# Patient Record
Sex: FEMALE | Race: BLACK OR AFRICAN AMERICAN | NOT HISPANIC OR LATINO | Employment: FULL TIME | URBAN - METROPOLITAN AREA
[De-identification: names, ages, dates, MRNs, and addresses within clinical notes are randomized per-mention and may not be internally consistent; named-entity substitution may affect disease eponyms.]

---

## 2020-09-29 ENCOUNTER — TELEPHONE (OUTPATIENT)
Dept: FAMILY MEDICINE CLINIC | Facility: CLINIC | Age: 50
End: 2020-09-29

## 2020-09-29 NOTE — TELEPHONE ENCOUNTER
PATIENT IS CALLING NEEDING DR. RICCI TO  REFER TO A OBGYN DOCTOR FOR HER YEARLY EXAM      PATIENT WOULD LIKE TO HAVE A CALL BACK WITH HER RESULTS FROM DR. MOELLER OFFICE ON HER HEART MONITOR AND EKG THAT WAS DONE.        PATIENT CONTACT @[phone]

## 2020-09-29 NOTE — TELEPHONE ENCOUNTER
Report not posted yet on [employer] website. They have just received the monitor today. Will check back tomorrow to see if report is available.

## 2020-09-29 NOTE — TELEPHONE ENCOUNTER
PATIENT WOULD LIKE TO HAVE A CALL BACK WITH HER RESULTS FROM DR. MOELLER OFFICE ON HER HEART MONITOR AND EKG THAT WAS DONE.           PATIENT CONTACT @[phone]

## 2020-10-02 NOTE — TELEPHONE ENCOUNTER
Spoke with patient and informed her I can review test results with dr miller on Monday and will call her Tuesday.

## 2021-10-13 ENCOUNTER — TELEPHONE (OUTPATIENT)
Dept: FAMILY MEDICINE CLINIC | Facility: CLINIC | Age: 51
End: 2021-10-13

## 2021-10-13 NOTE — TELEPHONE ENCOUNTER
Caller: CorreaDave    Relationship: Self    Best call back number: [phone] (M)    Caller requesting test results: PATIENT    What test was performed: LABS    When was the test performed: 10/5/21    Where was the test performed: IN OFFICE    Additional notes: PLEASE CALL PATIENT WITH RESULTS. THANK YOU.

## 2021-10-14 NOTE — TELEPHONE ENCOUNTER
A1C normal, blood sugar just borderline  Thyroid, kidney and liver function normal  Cholesterol mildly high, though improved from last check.  Vitamin D normal range  B12 normal range.

## 2021-10-14 NOTE — TELEPHONE ENCOUNTER
Caller: Dave Correa    Relationship: Self    Best call back number: [phone]    What was the call regarding: PLEASE CALL PATIENT WITH LAB RESULTS.    Do you require a callback: YES

## 2022-11-04 ENCOUNTER — TELEPHONE (OUTPATIENT)
Dept: FAMILY MEDICINE CLINIC | Facility: CLINIC | Age: 52
End: 2022-11-04

## 2022-11-04 NOTE — TELEPHONE ENCOUNTER
Caller: Dave Correa    Relationship: Self    Best call back number: [phone]    What orders are you requesting (i.e. lab or imaging): MAMMOGRAM     In what timeframe would the patient need to come in: ASAP     Where will you receive your lab/imaging services: PAT[facility] [address]     Additional notes: PATIENT WOULD LIKE FOR THIS ORDER TO BE PUT IN AND FOR SOMEONE TO GIVE HER A CALL TO GET HER SCHEDULED.    PLEASE ADVISE

## 2023-06-20 ENCOUNTER — TELEPHONE (OUTPATIENT)
Dept: FAMILY MEDICINE CLINIC | Facility: CLINIC | Age: 53
End: 2023-06-20

## 2023-06-20 NOTE — TELEPHONE ENCOUNTER
Caller: Dave Correa    Relationship to patient: Self    Best call back number: [phone]    Patient is needing:     SAW DOCTOR RICCI AT THE END OF MARCH AND WAS TOLD IF THE PROBLEMS PERSISTED THAT HE WOULD SEND HER TO A SPECIALIST.    SHE STATES THE ISSUES ARE ONGOING AND WOULD LIKE TO GO AHEAD AND GET THAT REFERRAL.    WOULD LIKE A CALLBACK TO DISCUSS.

## 2023-06-21 NOTE — TELEPHONE ENCOUNTER
Pt states she is still having issues with her butt and states she had mentioned this at last appt please advise

## 2023-08-02 ENCOUNTER — TELEPHONE (OUTPATIENT)
Dept: FAMILY MEDICINE CLINIC | Facility: CLINIC | Age: 53
End: 2023-08-02

## 2023-08-02 NOTE — TELEPHONE ENCOUNTER
PATIENT CALLED STATING THAT SHE IS HAVING PAIN IN HER RIGHT BREAST AND IS UNSURE HOW TO PROCEED.    THE PAIN OCCURS AT THE BOTTOM OF THE BREAST, MOST NOTICEABLE WHEN BENDING OVER OR LAYING DOWN.    PLEASE ADVISE  [phone]

## 2023-08-14 ENCOUNTER — OFFICE VISIT (OUTPATIENT)
Dept: SURGERY | Facility: CLINIC | Age: 53
End: 2023-08-14
Payer: COMMERCIAL

## 2023-08-14 VITALS
BODY MASS INDEX: 33.46 KG/M2 | WEIGHT: 196 LBS | HEIGHT: 64 IN | SYSTOLIC BLOOD PRESSURE: 168 MMHG | DIASTOLIC BLOOD PRESSURE: 90 MMHG

## 2023-08-14 DIAGNOSIS — N64.4 MASTODYNIA: Primary | ICD-10-CM

## 2023-08-14 PROCEDURE — 99204 OFFICE O/P NEW MOD 45 MIN: CPT | Performed by: STUDENT IN AN ORGANIZED HEALTH CARE EDUCATION/TRAINING PROGRAM

## 2023-08-14 NOTE — PROGRESS NOTES
GENERAL SURGERY HISTORY AND PHYSICAL     SUMMARY:  52 y.o. F with a new diagnosis of right greater than left mastodynia.  -Surgical plan: diagnostic mammogram and ultrasound.  -Nichelleneymar Terrence lifetime breast cancer risk: 9.2%. This patient does not qualify for high risk screening.     Referring Provider: Ramy Galarza DO    Chief complaint: Bilateral breast pain, right greater than left    HPI: Ms. Dave Crowell is a 53 yo woman, seen at the request of Ramy Galarza DO, for a new diagnosis of mastodynia.  She states that for the past 2 weeks she has noted pain in her bilateral breasts, mild in the left for about 4 days, and moderate in the right side for about 2 weeks.  She denies any trauma to the area.  She states that she has been managing her pain with ibuprofen.  She has noticed the pain is worse with movement like stretching and transitioning from lying to sitting.  She states that her pain is located at her inframammary folds mostly along her chest wall.  She is uncertain about feeling any new lumps in her breasts or in her axillae.  She does have a lump in her right axilla which she states is stable and previously has been evaluated with diagnostic imaging and found to be a cyst.  She denies any changes to her nipples including nipple inversion, retraction, or drainage.  She denies any changes to the skin overlying her breasts or axillae.  She denies fevers or chills.    She has had regular annual mammograms, with notable results outlined below.    She has a family history of breast cancer in her aunt, and chart review lists a great aunt, maternal cousin, maternal aunt, and paternal aunt as having had breast cancer. She denies any family history of ovarian cancer.     TIMELINE OF WORKUP:    On 10/27/2021 she underwent limited left breast ultrasound and diagnostic bilateral mammogram to evaluate palpable areas of concern in the left breast.  Mammogram demonstrated scattered areas of  fibroglandular tissue. In the right breast, no suspicious masses, calcifications, or areas of distortion were seen.  In the left breast, no discrete abnormality was seen in the region of any of the skin markers marking the patient's palpable areas of concern. Imaging of the palpable areas in the 2:00 left breast 2 cm from the nipple, 2:00 5 cm from the nipple, and 12:00 5 cm from the nipple demonstrated normal breast tissue.  No suspicious masses, calcifications, or areas of distortion were seen. Targeted ultrasound demonstrated no suspicious mass; an incidentally noted 5 mm cyst was seen in the 12:00 left breast 4 cm from the nipple. The overall assessment was BI-RADS Category 2, benign finding, recommending routine follow-up.  The patient was recommended to continue routine annual screening mammogram.    On 2022, the patient underwent bilateral diagnostic mammogram and bilateral breast ultrasound for focal pain in the left breast and a palpable lump in the right axilla which had increased over time.  The breast parenchyma was composed of scattered areas of fibroglandular density.  No suspicious abnormality was identified in either breast.  Targeted ultrasound of the right axilla demonstrated a 21 mm hypoechoic lesion with a tract extending to the skin surface characteristic of a benign dermal lesion, likely a sebaceous cyst or furuncle, for which no follow-up was recommended.  Targeted ultrasound of the left breast was performed at the site of pain at the 2 o'clock position in the subareolar location of the left breast which demonstrated normal glandular tissue.  Continued annual screening mammography was recommended for the patient.    MEDICAL HISTORY:     Gynecologic History:   . P:2  Age at first childbirth: 27  Age at menarche: 11  Age at menopause: Underwent hysterectomy in  for fibroids  Total years of oral contraceptive use: 0  Total years of hormone replacement therapy: 0    Past Medical  History:   Past Medical History:   Diagnosis Date    Arthritis     Hypertension       Vitamin D deficiency    Past Surgical History:    Past Surgical History:   Procedure Laterality Date    COLONOSCOPY N/A 06/25/2019    Procedure: COLONOSCOPY INTO CECUM AND TERMINAL ILEUM WITH COLD BIOPSY POLYPECTOMY;  Surgeon: Hernan Hernandez MD;  Location: [facility] ENDOSCOPY;  Service: Gastroenterology    HYSTERECTOMY Bilateral     TUBAL ABDOMINAL LIGATION Bilateral        Family History:    Family History   Problem Relation Age of Onset    Hypertension Mother     Arthritis Mother     Diabetes Father     Hypertension Father     Arthritis Father     Prostate cancer Father     Rectal cancer Father     Stroke Maternal Aunt     Kidney disease Maternal Aunt     Cancer Maternal Grandmother         lung, smoker    Diabetes Maternal Grandmother     Cancer Maternal Grandfather         uncertain type    Diabetes Maternal Grandfather     Diabetes Paternal Grandmother     Cancer Paternal Grandfather         uncertain    Diabetes Paternal Grandfather        Social History:   Social History     Socioeconomic History    Marital status:    Tobacco Use    Smoking status: Never    Smokeless tobacco: Never   Vaping Use    Vaping Use: Never used   Substance and Sexual Activity    Alcohol use: No    Drug use: No    Sexual activity: Defer     Denies tobacco use  Occasional alcohol use approximately 2 times per month  Denies recreational drug use    ALLERGIES:   No Known Allergies    MEDICATIONS:     Current Outpatient Medications:     Cholecalciferol (Vitamin D3) 1.25 MG (22913 UT) capsule, Take 1 capsule by mouth Every 7 (Seven) Days., Disp: 12 capsule, Rfl: 3    ibuprofen (ADVIL,MOTRIN) 800 MG tablet, Take 1 tablet by mouth Every 8 (Eight) Hours As Needed for Fever (pain). (Patient not taking: Reported on 8/14/2023), Disp: 90 tablet, Rfl: 1    ROS:   Constitutional: Negative for fevers or chills  HENT: Negative for hearing loss  Eyes: Negative for  vision changes  Respiratory: Negative for cough or shortness of breath  Cardiovascular: Negative for chest pain or heart palpitations  Gastrointestinal: Negative for abdominal pain, nausea, vomiting  Musculoskeletal: Negative for joint swelling or gait instability    PHYSICAL EXAM:   Constitutional: Well-developed well-nourished, no acute distress  Eyes: Conjunctiva normal, sclera nonicteric  ENMT: Hearing grossly normal, oral mucosa moist  Neck: Supple, trachea midline  Respiratory: normal inspiratory effort  Cardiovascular: Regular rate, no peripheral edema  Breast: symmetric  Right: No visible abnormalities on inspection while seated, with arms raised or hands on hips. No masses, skin changes, or nipple abnormalities.  Left: No visible abnormalities on inspection while seated, with arms raised or hands on hips. No masses, skin changes, or nipple abnormalities.  Gastrointestinal: Soft, nontender  Lymphatics (palpable nodes): No cervical, supraclavicular or axillary lymphadenopathy. There is a soft palpable subcutaenous mass of the right axilla which patient states has been evaluated on imaging and has remained stable. There is no skin discoloration, tenderness, or drainage of this area.  Skin:  Warm, dry, no rash on visualized skin surfaces  Musculoskeletal: Symmetric strength, normal gait  Psychiatric: Alert and oriented x3, normal affect     Assessment/Plan:  52-year-old female presenting with noncyclical asymmetric right greater than left mastodynia. Physical exam demonstrates no palpable or visual abnormalities with the exception of the subcutaneous mass in her right axilla which the patient states has been stable and previously evaluated with diagnostic imaging. I will recommend diagnostic mammogram and ultrasound and have her follow-up with me in clinic for discussion of the results.    Ramya Gonzalez M.D.  General, Robotic, and Endoscopic Surgery  [facility] [address]  [place], [place], [address]  P: [phone]  F: [phone]

## 2023-08-21 ENCOUNTER — TELEPHONE (OUTPATIENT)
Dept: SURGERY | Facility: CLINIC | Age: 53
End: 2023-08-21

## 2023-08-21 NOTE — TELEPHONE ENCOUNTER
PT HAS CALLED STATING THAT THE NEXT AVAIL DX MAMMO & U/S BREAST IS 9/27. SHE'S WANTING TO KNOW IF SHE CAN GO OUTSIDE OF [facility] TO HAVE THIS DONE.

## 2023-09-27 ENCOUNTER — HOSPITAL ENCOUNTER (OUTPATIENT)
Dept: MAMMOGRAPHY | Facility: HOSPITAL | Age: 53
Discharge: HOME | End: 2023-09-27
Payer: COMMERCIAL

## 2023-09-27 ENCOUNTER — HOSPITAL ENCOUNTER (OUTPATIENT)
Dept: ULTRASOUND IMAGING | Facility: HOSPITAL | Age: 53
Discharge: HOME | End: 2023-09-27
Payer: COMMERCIAL

## 2023-09-27 DIAGNOSIS — N64.4 MASTODYNIA: ICD-10-CM

## 2023-09-27 PROCEDURE — 77066 DX MAMMO INCL CAD BI: CPT

## 2023-09-27 PROCEDURE — G0279 TOMOSYNTHESIS, MAMMO: HCPCS

## 2023-09-27 PROCEDURE — 76642 ULTRASOUND BREAST LIMITED: CPT

## 2023-09-28 ENCOUNTER — DOCUMENTATION (OUTPATIENT)
Dept: FAMILY MEDICINE CLINIC | Facility: CLINIC | Age: 53
End: 2023-09-28
Payer: COMMERCIAL

## 2023-10-02 ENCOUNTER — TELEPHONE (OUTPATIENT)
Dept: SURGERY | Facility: CLINIC | Age: 53
End: 2023-10-02
Payer: COMMERCIAL

## 2023-10-02 NOTE — TELEPHONE ENCOUNTER
Left voicemail with patient regarding results of recent mammogram and ultrasound for evaluation of bilateral breast pain. Imaging studies demonstrated BIRADS-1 findings, negative for evidence of malignancy or significant change in either breast. Routine follow up mammography recommended.      Ramya Gonzalez M.D.  General, Robotic, and Endoscopic Surgery  [facility]    [address], [address]  [place], [place], [address]  P: [phone]  F: [phone]

## 2023-11-27 ENCOUNTER — OFFICE VISIT (OUTPATIENT)
Dept: FAMILY MEDICINE CLINIC | Facility: CLINIC | Age: 53
End: 2023-11-27
Payer: COMMERCIAL

## 2023-11-27 VITALS
BODY MASS INDEX: 35.17 KG/M2 | SYSTOLIC BLOOD PRESSURE: 122 MMHG | HEIGHT: 64 IN | OXYGEN SATURATION: 98 % | DIASTOLIC BLOOD PRESSURE: 82 MMHG | HEART RATE: 65 BPM | WEIGHT: 206 LBS

## 2023-11-27 DIAGNOSIS — Z00.00 WELLNESS EXAMINATION: Primary | ICD-10-CM

## 2023-11-27 DIAGNOSIS — L80 VITILIGO: ICD-10-CM

## 2023-11-27 DIAGNOSIS — E78.5 DYSLIPIDEMIA: ICD-10-CM

## 2023-11-27 DIAGNOSIS — R73.01 IFG (IMPAIRED FASTING GLUCOSE): ICD-10-CM

## 2023-11-27 DIAGNOSIS — E66.9 OBESITY (BMI 30-39.9): ICD-10-CM

## 2023-11-27 DIAGNOSIS — Z12.11 ENCOUNTER FOR COLORECTAL CANCER SCREENING: ICD-10-CM

## 2023-11-27 DIAGNOSIS — Z12.12 ENCOUNTER FOR COLORECTAL CANCER SCREENING: ICD-10-CM

## 2023-11-27 DIAGNOSIS — L81.9 PIGMENTED SKIN LESION: ICD-10-CM

## 2023-11-27 DIAGNOSIS — K63.5 HYPERPLASTIC COLONIC POLYP, UNSPECIFIED PART OF COLON: ICD-10-CM

## 2023-11-27 RX ORDER — TOPIRAMATE 25 MG/1
25 TABLET ORAL 2 TIMES DAILY
Qty: 60 TABLET | Refills: 5 | Status: SHIPPED | OUTPATIENT
Start: 2023-11-27 | End: 2023-12-01

## 2023-11-27 RX ORDER — PHENTERMINE HYDROCHLORIDE 37.5 MG/1
TABLET ORAL
Qty: 15 TABLET | Refills: 2 | Status: SHIPPED | OUTPATIENT
Start: 2023-11-27 | End: 2023-12-01

## 2023-11-27 NOTE — PATIENT INSTRUCTIONS
Calorie Counting for Weight Loss  Calories are units of energy. Your body needs a certain number of calories from food to keep going throughout the day. When you eat or drink more calories than your body needs, your body stores the extra calories mostly as fat. When you eat or drink fewer calories than your body needs, your body burns fat to get the energy it needs.  Calorie counting means keeping track of how many calories you eat and drink each day. Calorie counting can be helpful if you need to lose weight. If you eat fewer calories than your body needs, you should lose weight. Ask your health care provider what a healthy weight is for you.  For calorie counting to work, you will need to eat the right number of calories each day to lose a healthy amount of weight per week. A dietitian can help you figure out how many calories you need in a day and will suggest ways to reach your calorie goal.  A healthy amount of weight to lose each week is usually 1-2 lb (0.5-0.9 kg). This usually means that your daily calorie intake should be reduced by 500-750 calories.  Eating 1,200-1,500 calories a day can help most women lose weight.  Eating 1,500-1,800 calories a day can help most men lose weight.  What do I need to know about calorie counting?  Work with your health care provider or dietitian to determine how many calories you should get each day. To meet your daily calorie goal, you will need to:  Find out how many calories are in each food that you would like to eat. Try to do this before you eat.  Decide how much of the food you plan to eat.  Keep a food log. Do this by writing down what you ate and how many calories it had.  To successfully lose weight, it is important to balance calorie counting with a healthy lifestyle that includes regular activity.  Where do I find calorie information?  The number of calories in a food can be found on a Nutrition Facts label. If a food does not have a Nutrition Facts label, try to  look up the calories online or ask your dietitian for help.  Remember that calories are listed per serving. If you choose to have more than one serving of a food, you will have to multiply the calories per serving by the number of servings you plan to eat. For example, the label on a package of bread might say that a serving size is 1 slice and that there are 90 calories in a serving. If you eat 1 slice, you will have eaten 90 calories. If you eat 2 slices, you will have eaten 180 calories.  How do I keep a food log?  After each time that you eat, record the following in your food log as soon as possible:  What you ate. Be sure to include toppings, sauces, and other extras on the food.  How much you ate. This can be measured in cups, ounces, or number of items.  How many calories were in each food and drink.  The total number of calories in the food you ate.  Keep your food log near you, such as in a pocket-sized notebook or on an sharee or website on your mobile phone. Some programs will calculate calories for you and show you how many calories you have left to meet your daily goal.  What are some portion-control tips?  Know how many calories are in a serving. This will help you know how many servings you can have of a certain food.  Use a measuring cup to measure serving sizes. You could also try weighing out portions on a kitchen scale. With time, you will be able to estimate serving sizes for some foods.  Take time to put servings of different foods on your favorite plates or in your favorite bowls and cups so you know what a serving looks like.  Try not to eat straight from a food's packaging, such as from a bag or box. Eating straight from the package makes it hard to see how much you are eating and can lead to overeating. Put the amount you would like to eat in a cup or on a plate to make sure you are eating the right portion.  Use smaller plates, glasses, and bowls for smaller portions and to prevent  overeating.  Try not to multitask. For example, avoid watching TV or using your computer while eating. If it is time to eat, sit down at a table and enjoy your food. This will help you recognize when you are full. It will also help you be more mindful of what and how much you are eating.  What are tips for following this plan?  Reading food labels  Check the calorie count compared with the serving size. The serving size may be smaller than what you are used to eating.  Check the source of the calories. Try to choose foods that are high in protein, fiber, and vitamins, and low in saturated fat, trans fat, and sodium.  Shopping  Read nutrition labels while you shop. This will help you make healthy decisions about which foods to buy.  Pay attention to nutrition labels for low-fat or fat-free foods. These foods sometimes have the same number of calories or more calories than the full-fat versions. They also often have added sugar, starch, or salt to make up for flavor that was removed with the fat.  Make a grocery list of lower-calorie foods and stick to it.  Cooking  Try to cook your favorite foods in a healthier way. For example, try baking instead of frying.  Use low-fat dairy products.  Meal planning  Use more fruits and vegetables. One-half of your plate should be fruits and vegetables.  Include lean proteins, such as chicken, turkey, and fish.  Lifestyle  Each week, aim to do one of the followin minutes of moderate exercise, such as walking.  75 minutes of vigorous exercise, such as running.  General information  Know how many calories are in the foods you eat most often. This will help you calculate calorie counts faster.  Find a way of tracking calories that works for you. Get creative. Try different apps or programs if writing down calories does not work for you.  What foods should I eat?    Eat nutritious foods. It is better to have a nutritious, high-calorie food, such as an avocado, than a food with  few nutrients, such as a bag of potato chips.  Use your calories on foods and drinks that will fill you up and will not leave you hungry soon after eating.  Examples of foods that fill you up are nuts and nut butters, vegetables, lean proteins, and high-fiber foods such as whole grains. High-fiber foods are foods with more than 5 g of fiber per serving.  Pay attention to calories in drinks. Low-calorie drinks include water and unsweetened drinks.  The items listed above may not be a complete list of foods and beverages you can eat. Contact a dietitian for more information.  What foods should I limit?  Limit foods or drinks that are not good sources of vitamins, minerals, or protein or that are high in unhealthy fats. These include:  Candy.  Other sweets.  Sodas, specialty coffee drinks, alcohol, and juice.  The items listed above may not be a complete list of foods and beverages you should avoid. Contact a dietitian for more information.  How do I count calories when eating out?  Pay attention to portions. Often, portions are much larger when eating out. Try these tips to keep portions smaller:  Consider sharing a meal instead of getting your own.  If you get your own meal, eat only half of it. Before you start eating, ask for a container and put half of your meal into it.  When available, consider ordering smaller portions from the menu instead of full portions.  Pay attention to your food and drink choices. Knowing the way food is cooked and what is included with the meal can help you eat fewer calories.  If calories are listed on the menu, choose the lower-calorie options.  Choose dishes that include vegetables, fruits, whole grains, low-fat dairy products, and lean proteins.  Choose items that are boiled, broiled, grilled, or steamed. Avoid items that are buttered, battered, fried, or served with cream sauce. Items labeled as crispy are usually fried, unless stated otherwise.  Choose water, low-fat milk,  unsweetened iced tea, or other drinks without added sugar. If you want an alcoholic beverage, choose a lower-calorie option, such as a glass of wine or light beer.  Ask for dressings, sauces, and syrups on the side. These are usually high in calories, so you should limit the amount you eat.  If you want a salad, choose a garden salad and ask for grilled meats. Avoid extra toppings such as espinosa, cheese, or fried items. Ask for the dressing on the side, or ask for olive oil and vinegar or lemon to use as dressing.  Estimate how many servings of a food you are given. Knowing serving sizes will help you be aware of how much food you are eating at restaurants.  Where to find more information  Centers for Disease Control and Prevention: www.cdc.gov  [place] Department of Agriculture: myplate.gov  Summary  Calorie counting means keeping track of how many calories you eat and drink each day. If you eat fewer calories than your body needs, you should lose weight.  A healthy amount of weight to lose per week is usually 1-2 lb (0.5-0.9 kg). This usually means reducing your daily calorie intake by 500-750 calories.  The number of calories in a food can be found on a Nutrition Facts label. If a food does not have a Nutrition Facts label, try to look up the calories online or ask your dietitian for help.  Use smaller plates, glasses, and bowls for smaller portions and to prevent overeating.  Use your calories on foods and drinks that will fill you up and not leave you hungry shortly after a meal.  This information is not intended to replace advice given to you by your health care provider. Make sure you discuss any questions you have with your health care provider.  Document Revised: 01/28/2021 Document Reviewed: 01/28/2021  Elsevier Patient Education © 2022 [employer]    Exercising to Lose Weight  Getting regular exercise is important for everyone. It is especially important if you are overweight. Being overweight increases  your risk of heart disease, stroke, diabetes, high blood pressure, and several types of cancer. Exercising, and reducing the calories you consume, can help you lose weight and improve fitness and health.  Exercise can be moderate or vigorous intensity. To lose weight, most people need to do a certain amount of moderate or vigorous-intensity exercise each week.  How can exercise affect me?  You lose weight when you exercise enough to burn more calories than you eat. Exercise also reduces body fat and builds muscle. The more muscle you have, the more calories you burn. Exercise also:  Improves mood.  Reduces stress and tension.  Improves your overall fitness, flexibility, and endurance.  Increases bone strength.  Moderate-intensity exercise  Moderate-intensity exercise is any activity that gets you moving enough to burn at least three times more energy (calories) than if you were sitting.  Examples of moderate exercise include:  Walking a mile in 15 minutes.  Doing light yard work.  Biking at an easy pace.  Most people should get at least 150 minutes of moderate-intensity exercise a week to maintain their body weight.  Vigorous-intensity exercise  Vigorous-intensity exercise is any activity that gets you moving enough to burn at least six times more calories than if you were sitting. When you exercise at this intensity, you should be working hard enough that you are not able to carry on a conversation.  Examples of vigorous exercise include:  Running.  Playing a team sport, such as football, basketball, and soccer.  Jumping rope.  Most people should get at least 75 minutes a week of vigorous exercise to maintain their body weight.  What actions can I take to lose weight?  The amount of exercise you need to lose weight depends on:  Your age.  The type of exercise.  Any health conditions you have.  Your overall physical ability.  Talk to your health care provider about how much exercise you need and what types of  activities are safe for you.  Nutrition    Make changes to your diet as told by your health care provider or diet and nutrition specialist (dietitian). This may include:  Eating fewer calories.  Eating more protein.  Eating less unhealthy fats.  Eating a diet that includes fresh fruits and vegetables, whole grains, low-fat dairy products, and lean protein.  Avoiding foods with added fat, salt, and sugar.  Drink plenty of water while you exercise to prevent dehydration or heat stroke.  Activity  Choose an activity that you enjoy and set realistic goals. Your health care provider can help you make an exercise plan that works for you.  Exercise at a moderate or vigorous intensity most days of the week.  The intensity of exercise may vary from person to person. You can tell how intense a workout is for you by paying attention to your breathing and heartbeat. Most people will notice their breathing and heartbeat get faster with more intense exercise.  Do resistance training twice each week, such as:  Push-ups.  Sit-ups.  Lifting weights.  Using resistance bands.  Getting short amounts of exercise can be just as helpful as long, structured periods of exercise. If you have trouble finding time to exercise, try doing these things as part of your daily routine:  Get up, stretch, and walk around every 30 minutes throughout the day.  Go for a walk during your lunch break.  Park your car farther away from your destination.  If you take public transportation, get off one stop early and walk the rest of the way.  Make phone calls while standing up and walking around.  Take the stairs instead of elevators or escalators.  Wear comfortable clothes and shoes with good support.  Do not exercise so much that you hurt yourself, feel dizzy, or get very short of breath.  Where to find more information  [place] Department of Health and Human Services: www.hhs.gov  Centers for Disease Control and Prevention: www.cdc.gov  Contact a health care  provider:  Before starting a new exercise program.  If you have questions or concerns about your weight.  If you have a medical problem that keeps you from exercising.  Get help right away if:  You have any of the following while exercising:  Injury.  Dizziness.  Difficulty breathing or shortness of breath that does not go away when you stop exercising.  Chest pain.  Rapid heartbeat.  These symptoms may represent a serious problem that is an emergency. Do not wait to see if the symptoms will go away. Get medical help right away. Call your local emergency services (911 in the [place] Do not drive yourself to the hospital.  Summary  Getting regular exercise is especially important if you are overweight.  Being overweight increases your risk of heart disease, stroke, diabetes, high blood pressure, and several types of cancer.  Losing weight happens when you burn more calories than you eat.  Reducing the amount of calories you eat, and getting regular moderate or vigorous exercise each week, helps you lose weight.  This information is not intended to replace advice given to you by your health care provider. Make sure you discuss any questions you have with your health care provider.  Document Revised: 02/13/2022 Document Reviewed: 02/13/2022  Elsevier Patient Education © 2022 [employer]vier Inc.

## 2023-11-27 NOTE — PROGRESS NOTES
"Subjective   Dave Crowell is a 53 y.o. female. Presents today for   Chief Complaint   Patient presents with    Annual Exam    spot on face       History of Present Illness  Patient 52 y/o female here for wellness exam;  has spot on face, not seen derm;   due c-scope next year;   no cp/soa;  struggling to lose weight;    Has whitish lesion and dark lesions face    Review of Systems   Constitutional:  Negative for chills and fever.   Respiratory:  Negative for shortness of breath.    Cardiovascular:  Negative for chest pain and palpitations.   Gastrointestinal:  Negative for abdominal pain.       Patient Active Problem List   Diagnosis    Family history of colon cancer    Anemia    S/P MAXIMO (total abdominal hysterectomy)    Urinary tract infection    Hypertension    Interstitial cystitis    Syncope and collapse       Social History     Socioeconomic History    Marital status:    Tobacco Use    Smoking status: Never    Smokeless tobacco: Never   Vaping Use    Vaping Use: Never used   Substance and Sexual Activity    Alcohol use: No    Drug use: No    Sexual activity: Defer       No Known Allergies    Current Outpatient Medications on File Prior to Visit   Medication Sig Dispense Refill    Cholecalciferol (Vitamin D3) 1.25 MG (63519 UT) capsule Take 1 capsule by mouth Every 7 (Seven) Days. 12 capsule 3    ibuprofen (ADVIL,MOTRIN) 800 MG tablet Take 1 tablet by mouth Every 8 (Eight) Hours As Needed for Fever (pain). 90 tablet 1     No current facility-administered medications on file prior to visit.       Objective   Vitals:    11/27/23 0810   BP: 122/82   Pulse: 65   SpO2: 98%   Weight: 93.4 kg (206 lb)   Height: 162.6 cm (64\")     Body mass index is 35.36 kg/m².    Physical Exam  Vitals and nursing note reviewed.   Constitutional:       Appearance: She is well-developed.   HENT:      Head: Normocephalic and atraumatic.   Neck:      Thyroid: No thyromegaly.      Vascular: No JVD.   Cardiovascular:    "   Rate and Rhythm: Normal rate and regular rhythm.      Heart sounds: Normal heart sounds. No murmur heard.     No friction rub. No gallop.   Pulmonary:      Effort: Pulmonary effort is normal. No respiratory distress.      Breath sounds: Normal breath sounds. No wheezing or rales.   Abdominal:      General: Bowel sounds are normal. There is no distension.      Palpations: Abdomen is soft.      Tenderness: There is no abdominal tenderness. There is no guarding or rebound.   Musculoskeletal:      Cervical back: Neck supple.   Skin:     General: Skin is warm and dry.      Comments: Left check vitiligo;   b/l pigmented lesiosn   Neurological:      Mental Status: She is alert.   Psychiatric:         Behavior: Behavior normal.         Assessment & Plan   Diagnoses and all orders for this visit:    1. Wellness examination (Primary)    2. Obesity (BMI 30-39.9)  -     phentermine (ADIPEX-P) 37.5 MG tablet; 1/2 po daily  Dispense: 15 tablet; Refill: 2  -     topiramate (TOPAMAX) 25 MG tablet; Take 1 tablet by mouth 2 (Two) Times a Day.  Dispense: 60 tablet; Refill: 5    3. Vitiligo  -     Ambulatory Referral to Dermatology    4. Pigmented skin lesion  -     Ambulatory Referral to Dermatology    5. Encounter for colorectal cancer screening  -     Ambulatory Referral For Screening Colonoscopy    6. Hyperplastic colonic polyp, unspecified part of colon  -     Ambulatory Referral For Screening Colonoscopy    7. Dyslipidemia  -     Cancel: Comprehensive Metabolic Panel  -     Cancel: Lipid Panel  -     Comprehensive Metabolic Panel  -     Lipid Panel    8. IFG (impaired fasting glucose)  -     Hemoglobin A1c    Counseled on diet and exercise  -counseled on diet and exercise along with weight loss.  Desires assistance with weight loss and discussed several medication optiosn;  Will try phentermine and trokendi, warned of side effects and risks;  Warned of addiction potential and cardiovascular risks of phentermine.  Denies  glaucoma or renal stones;    Refer to dermatology  Hx of polyp due soon for c-scope will start refrral                 -Follow up: 6 months and prn

## 2023-11-30 ENCOUNTER — PATIENT ROUNDING (BHMG ONLY) (OUTPATIENT)
Dept: FAMILY MEDICINE CLINIC | Facility: CLINIC | Age: 53
End: 2023-11-30
Payer: COMMERCIAL

## 2023-11-30 LAB
ALBUMIN SERPL-MCNC: 4.1 G/DL (ref 3.5–5.2)
ALBUMIN/GLOB SERPL: 1.7 G/DL
ALP SERPL-CCNC: 74 U/L (ref 39–117)
ALT SERPL-CCNC: 32 U/L (ref 1–33)
AST SERPL-CCNC: 28 U/L (ref 1–32)
BILIRUB SERPL-MCNC: 0.3 MG/DL (ref 0–1.2)
BUN SERPL-MCNC: 14 MG/DL (ref 6–20)
BUN/CREAT SERPL: 23.3 (ref 7–25)
CALCIUM SERPL-MCNC: 9.2 MG/DL (ref 8.6–10.5)
CHLORIDE SERPL-SCNC: 109 MMOL/L (ref 98–107)
CHOLEST SERPL-MCNC: 221 MG/DL (ref 0–200)
CO2 SERPL-SCNC: 25.7 MMOL/L (ref 22–29)
CREAT SERPL-MCNC: 0.6 MG/DL (ref 0.57–1)
EGFRCR SERPLBLD CKD-EPI 2021: 106.2 ML/MIN/1.73
GLOBULIN SER CALC-MCNC: 2.4 GM/DL
GLUCOSE SERPL-MCNC: 99 MG/DL (ref 65–99)
HBA1C MFR BLD: 5.6 % (ref 4.8–5.6)
HDLC SERPL-MCNC: 50 MG/DL (ref 40–60)
LDLC SERPL CALC-MCNC: 146 MG/DL (ref 0–100)
POTASSIUM SERPL-SCNC: 4.4 MMOL/L (ref 3.5–5.2)
PROT SERPL-MCNC: 6.5 G/DL (ref 6–8.5)
SODIUM SERPL-SCNC: 144 MMOL/L (ref 136–145)
TRIGL SERPL-MCNC: 138 MG/DL (ref 0–150)
VLDLC SERPL CALC-MCNC: 25 MG/DL (ref 5–40)

## 2023-11-30 NOTE — PROGRESS NOTES
A [employer] message has been [place] to the patient [place] [facility] [place]

## 2023-12-01 ENCOUNTER — TELEPHONE (OUTPATIENT)
Dept: FAMILY MEDICINE CLINIC | Facility: CLINIC | Age: 53
End: 2023-12-01
Payer: COMMERCIAL

## 2023-12-01 ENCOUNTER — TELEPHONE (OUTPATIENT)
Dept: FAMILY MEDICINE CLINIC | Facility: CLINIC | Age: 53
End: 2023-12-01

## 2023-12-01 DIAGNOSIS — E66.9 OBESITY (BMI 30-39.9): Primary | ICD-10-CM

## 2023-12-01 DIAGNOSIS — R73.01 IFG (IMPAIRED FASTING GLUCOSE): ICD-10-CM

## 2023-12-01 RX ORDER — SEMAGLUTIDE 0.5 MG/.5ML
0.5 INJECTION, SOLUTION SUBCUTANEOUS WEEKLY
Qty: 2 ML | Refills: 0 | Status: SHIPPED | OUTPATIENT
Start: 2023-12-01

## 2023-12-01 RX ORDER — SEMAGLUTIDE 2.4 MG/.75ML
2.4 INJECTION, SOLUTION SUBCUTANEOUS WEEKLY
Qty: 6 ML | Refills: 2 | Status: SHIPPED | OUTPATIENT
Start: 2023-12-01

## 2023-12-01 RX ORDER — SEMAGLUTIDE 1.7 MG/.75ML
1.7 INJECTION, SOLUTION SUBCUTANEOUS WEEKLY
Qty: 2 ML | Refills: 0 | Status: SHIPPED | OUTPATIENT
Start: 2023-12-01

## 2023-12-01 RX ORDER — SEMAGLUTIDE 0.25 MG/.5ML
0.25 INJECTION, SOLUTION SUBCUTANEOUS WEEKLY
Qty: 2 ML | Refills: 0 | Status: SHIPPED | OUTPATIENT
Start: 2023-12-01

## 2023-12-01 RX ORDER — SEMAGLUTIDE 1 MG/.5ML
1 INJECTION, SOLUTION SUBCUTANEOUS WEEKLY
Qty: 2 ML | Refills: 0 | Status: SHIPPED | OUTPATIENT
Start: 2023-12-01

## 2023-12-01 NOTE — TELEPHONE ENCOUNTER
Caller: Dave Crowell    Relationship: Self    Best call back number: [phone]     What was the call regarding: PATIENT STATED THAT SHE LEFT A FORM ON MONDAY FOR DR RICCI TO COMPLETE FOR HER INSURANCE. PATIENT STATED THAT SHE SAW IN [facility] IT SHOWED SHE LAST COMPLETED A VISIT ON 11/22/2022 SO SHE IS CALLING TO MAKE SURE THAT THE VISIT FROM 03/28/2023 IS NOTED ON HER PAPERWORK. PLEASE ADVISE.

## 2023-12-01 NOTE — TELEPHONE ENCOUNTER
Caller: Dave Crowell    Relationship: Self    Best call back number: [phone]     What is the best time to reach you: ANYTIME    Who are you requesting to speak with (clinical staff, provider,  specific staff member): CLINICAL    Do you know the name of the person who called: UNSURE    What was the call regarding: PATIENT RETURNING CALL SHE IS UNSURE IF IT WAS ABOUT THE MESSAGE THAT WAS LEFT    Is it okay if the provider responds through [employer]: YES

## 2023-12-01 NOTE — TELEPHONE ENCOUNTER
Caller: Dave Crowell    Relationship: Self    Best call back number: [phone]     What was the call regarding: PATIENT STATED THAT DR RICCI PRESCRIBED MEDICATION ON MONDAY. PATIENT STATED THAT SHE CHECKED WITH HER DOCTOR AND HER EYE PRESSURE IS HIGH, SO SHE IS ASKING TO GO WITH THE OTHER OPTION THAT DR RICCI OFFERED HER. PATIENT IS ASKING IF DR RICCI CAN CHECK HER LAB WORK AND SEE IF INSURANCE WILL PAY FOR IT. PLEASE ADVISE.

## 2023-12-01 NOTE — TELEPHONE ENCOUNTER
Pt informed and voiced understanding. I also forwarded message to her through [employer] since she didn't have a pen to write it down.

## 2023-12-03 NOTE — PROGRESS NOTES
Mail copy of results to patient.  Kidney and liver function normal  A1C normal  Cholesterol elevated, though has improved.  Continue work on diet and exercsie

## 2024-01-03 ENCOUNTER — TELEPHONE (OUTPATIENT)
Dept: FAMILY MEDICINE CLINIC | Facility: CLINIC | Age: 54
End: 2024-01-03
Payer: COMMERCIAL

## 2024-01-03 NOTE — TELEPHONE ENCOUNTER
Returned patient's call on referrals for Dermatology and Gastroenterology. Gave ph#'s for both to call and schedule.

## 2024-01-05 ENCOUNTER — TELEPHONE (OUTPATIENT)
Dept: GASTROENTEROLOGY | Facility: CLINIC | Age: 54
End: 2024-01-05
Payer: COMMERCIAL

## 2024-01-05 NOTE — TELEPHONE ENCOUNTER
PT CALLED ABOUT SCHEDULING HER PROCEDURE FROM A REFERRAL.  SHE SAID THAT SHE HASN'T RECEIVED AN OA.  WILL YOU PLEASE SEND HER ONE.